# Patient Record
Sex: FEMALE | Race: WHITE | NOT HISPANIC OR LATINO | URBAN - METROPOLITAN AREA
[De-identification: names, ages, dates, MRNs, and addresses within clinical notes are randomized per-mention and may not be internally consistent; named-entity substitution may affect disease eponyms.]

---

## 2018-01-15 ENCOUNTER — IMPORTED ENCOUNTER (OUTPATIENT)
Dept: URBAN - METROPOLITAN AREA CLINIC 50 | Facility: CLINIC | Age: 83
End: 2018-01-15

## 2018-01-23 ENCOUNTER — IMPORTED ENCOUNTER (OUTPATIENT)
Dept: URBAN - METROPOLITAN AREA CLINIC 50 | Facility: CLINIC | Age: 83
End: 2018-01-23

## 2018-01-23 NOTE — PATIENT DISCUSSION
"""OCT OU today. "" ""Follow ERM w/o surgery. Call if vision decreases or distortion increases. Recommend regular Amsler checks.  """

## 2018-02-07 ENCOUNTER — IMPORTED ENCOUNTER (OUTPATIENT)
Dept: URBAN - METROPOLITAN AREA CLINIC 50 | Facility: CLINIC | Age: 83
End: 2018-02-07

## 2018-02-15 ENCOUNTER — IMPORTED ENCOUNTER (OUTPATIENT)
Dept: URBAN - METROPOLITAN AREA CLINIC 50 | Facility: CLINIC | Age: 83
End: 2018-02-15

## 2018-03-08 ENCOUNTER — IMPORTED ENCOUNTER (OUTPATIENT)
Dept: URBAN - METROPOLITAN AREA CLINIC 50 | Facility: CLINIC | Age: 83
End: 2018-03-08

## 2018-03-08 NOTE — PATIENT DISCUSSION
"""S/P IOL OS: Sensar AAB00 18.5 +TM +Pupilloplasty. Continue post operative instructions and drops per schedule.  """

## 2018-03-14 ENCOUNTER — IMPORTED ENCOUNTER (OUTPATIENT)
Dept: URBAN - METROPOLITAN AREA CLINIC 50 | Facility: CLINIC | Age: 83
End: 2018-03-14

## 2018-03-29 ENCOUNTER — IMPORTED ENCOUNTER (OUTPATIENT)
Dept: URBAN - METROPOLITAN AREA CLINIC 50 | Facility: CLINIC | Age: 83
End: 2018-03-29

## 2018-03-29 NOTE — PATIENT DISCUSSION
"""S/P IOL OD: Sensar AAB00 20.0 +TM. Continue post operative instructions and drops per schedule.  """

## 2018-04-03 ENCOUNTER — IMPORTED ENCOUNTER (OUTPATIENT)
Dept: URBAN - METROPOLITAN AREA CLINIC 50 | Facility: CLINIC | Age: 83
End: 2018-04-03

## 2018-04-20 ENCOUNTER — IMPORTED ENCOUNTER (OUTPATIENT)
Dept: URBAN - METROPOLITAN AREA CLINIC 50 | Facility: CLINIC | Age: 83
End: 2018-04-20

## 2018-05-01 ENCOUNTER — IMPORTED ENCOUNTER (OUTPATIENT)
Dept: URBAN - METROPOLITAN AREA CLINIC 50 | Facility: CLINIC | Age: 83
End: 2018-05-01

## 2018-05-01 NOTE — PATIENT DISCUSSION
"""S/P IOL OU: OD: Sensar AAB00 20.0 +TM. OS: Sensar AAB00 18.5 +TM +Pupilloplasty. MRx given today. "

## 2018-10-23 ENCOUNTER — IMPORTED ENCOUNTER (OUTPATIENT)
Dept: URBAN - METROPOLITAN AREA CLINIC 50 | Facility: CLINIC | Age: 83
End: 2018-10-23

## 2019-02-26 ENCOUNTER — IMPORTED ENCOUNTER (OUTPATIENT)
Dept: URBAN - METROPOLITAN AREA CLINIC 50 | Facility: CLINIC | Age: 84
End: 2019-02-26

## 2019-03-12 ENCOUNTER — IMPORTED ENCOUNTER (OUTPATIENT)
Dept: URBAN - METROPOLITAN AREA CLINIC 50 | Facility: CLINIC | Age: 84
End: 2019-03-12

## 2020-07-14 ENCOUNTER — IMPORTED ENCOUNTER (OUTPATIENT)
Dept: URBAN - METROPOLITAN AREA CLINIC 50 | Facility: CLINIC | Age: 85
End: 2020-07-14

## 2020-08-21 ENCOUNTER — IMPORTED ENCOUNTER (OUTPATIENT)
Dept: URBAN - METROPOLITAN AREA CLINIC 50 | Facility: CLINIC | Age: 85
End: 2020-08-21

## 2020-08-21 NOTE — PATIENT DISCUSSION
"""Lesion RLL does not need to be excised at this time.  Patient states lesion is not imitated and ""

## 2021-04-17 ASSESSMENT — VISUAL ACUITY
OD_BAT: 20/70
OD_PH: 20/40
OS_CC: J1@ 15 IN
OD_BAT: 20/400
OS_CC: 20/80
OD_CC: J1+@ 18 IN
OD_CC: 20/70+1
OD_OTHER: 20/400.
OD_PH: 20/25
OS_OTHER: 20/400. 20/400.
OS_CC: 20/60
OS_SC: 20/70
OD_PH: 20/30-
OD_CC: J1
OS_SC: 20/80
OD_PH: @ 15 IN
OD_OTHER: 20/400.
OS_BAT: 20/200
OS_BAT: 20/400
OD_CC: J2
OD_CC: J2
OS_OTHER: 20/200. >400.
OD_OTHER: 20/70. 20/80.
OS_SC: 20/60
OD_CC: J1@ 15 IN
OD_SC: 20/25-1
OD_BAT: 20/400
OD_SC: 20/40
OS_SC: 20/60-
OD_OTHER: 20/400.
OD_BAT: 20/400
OS_SC: 20/40
OS_CC: J1
OS_CC: J1+@ 18 IN
OS_OTHER: 20/400.
OD_SC: 20/40
OS_CC: 20/50+1
OD_SC: 20/30+2
OS_PH: 20/70
OS_SC: 20/70
OD_SC: 20/50-
OS_SC: 20/60-1
OS_OTHER: 20/400. 20/400.
OS_SC: 20/40
OD_SC: 20/40
OS_PH: 20/50-
OD_PH: 20/60
OD_CC: 20/60-1
OD_SC: 20/40-2
OD_PH: 20/40-1
OS_CC: J3
OD_SC: 20/40
OS_CC: J2
OS_PH: @ 15 IN
OD_SC: 20/25
OS_CC: 20/80-2
OS_PH: 20/50-1
OD_CC: 20/70+1
OS_BAT: 20/400
OS_BAT: 20/400

## 2021-04-17 ASSESSMENT — TONOMETRY
OS_IOP_MMHG: 11
OD_IOP_MMHG: 18
OS_IOP_MMHG: 33
OD_IOP_MMHG: 13
OD_IOP_MMHG: 11
OS_IOP_MMHG: 20
OS_IOP_MMHG: 12
OS_IOP_MMHG: 16
OS_IOP_MMHG: 19
OS_IOP_MMHG: 13
OD_IOP_MMHG: 11
OS_IOP_MMHG: 13
OS_IOP_MMHG: 13
OS_IOP_MMHG: 12
OS_IOP_MMHG: 13
OD_IOP_MMHG: 17
OD_IOP_MMHG: 13
OD_IOP_MMHG: 16
OS_IOP_MMHG: 13
OD_IOP_MMHG: 16
OD_IOP_MMHG: 12
OD_IOP_MMHG: 17
OD_IOP_MMHG: 13
OS_IOP_MMHG: 16

## 2021-07-09 ENCOUNTER — PREPPED CHART (OUTPATIENT)
Dept: URBAN - METROPOLITAN AREA CLINIC 50 | Facility: CLINIC | Age: 86
End: 2021-07-09

## 2021-07-13 ENCOUNTER — ANNUAL COMPREHENSIVE EXAM (OUTPATIENT)
Dept: URBAN - METROPOLITAN AREA CLINIC 50 | Facility: CLINIC | Age: 86
End: 2021-07-13

## 2021-07-13 DIAGNOSIS — H26.491: ICD-10-CM

## 2021-07-13 DIAGNOSIS — H35.373: ICD-10-CM

## 2021-07-13 PROCEDURE — 92134 CPTRZ OPH DX IMG PST SGM RTA: CPT

## 2021-07-13 PROCEDURE — 92014 COMPRE OPH EXAM EST PT 1/>: CPT

## 2021-07-13 ASSESSMENT — KERATOMETRY
OD_K2POWER_DIOPTERS: 45.00
OS_K2POWER_DIOPTERS: 45.25
OS_AXISANGLE2_DEGREES: 058
OD_AXISANGLE_DEGREES: 019
OD_K1POWER_DIOPTERS: 46.00
OS_AXISANGLE_DEGREES: 148
OS_K1POWER_DIOPTERS: 46.50
OD_AXISANGLE2_DEGREES: 109

## 2021-07-13 ASSESSMENT — VISUAL ACUITY
OU_CC: J2@14IN
OD_SC: 20/50+1
OS_SC: 20/70-1
OD_GLARE: 20/60
OD_GLARE: 20/40

## 2021-07-13 ASSESSMENT — TONOMETRY
OD_IOP_MMHG: 12
OS_IOP_MMHG: 14

## 2022-03-08 ENCOUNTER — ESTABLISHED PATIENT (OUTPATIENT)
Dept: URBAN - METROPOLITAN AREA CLINIC 50 | Facility: CLINIC | Age: 87
End: 2022-03-08

## 2022-03-08 DIAGNOSIS — H04.123: ICD-10-CM

## 2022-03-08 DIAGNOSIS — H26.491: ICD-10-CM

## 2022-03-08 DIAGNOSIS — H35.373: ICD-10-CM

## 2022-03-08 PROCEDURE — 92012 INTRM OPH EXAM EST PATIENT: CPT

## 2022-03-08 PROCEDURE — 92134 CPTRZ OPH DX IMG PST SGM RTA: CPT

## 2022-03-08 ASSESSMENT — TONOMETRY
OD_IOP_MMHG: 15
OS_IOP_MMHG: 15

## 2022-03-08 ASSESSMENT — VISUAL ACUITY
OS_CC: J1
OD_SC: 20/40-2 PUSH
OD_CC: J1
OS_SC: 20/70-1
OU_CC: J1

## 2022-03-08 ASSESSMENT — KERATOMETRY
OS_AXISANGLE2_DEGREES: 058
OS_K1POWER_DIOPTERS: 46.50
OD_AXISANGLE_DEGREES: 019
OS_K2POWER_DIOPTERS: 45.25
OD_AXISANGLE2_DEGREES: 109
OD_K1POWER_DIOPTERS: 46.00
OD_K2POWER_DIOPTERS: 45.00
OS_AXISANGLE_DEGREES: 148